# Patient Record
(demographics unavailable — no encounter records)

---

## 2025-01-30 NOTE — REVIEW OF SYSTEMS
[SOB] : shortness of breath [Chest Discomfort] : chest discomfort [Negative] : Heme/Lymph [Dyspnea on exertion] : not dyspnea during exertion [Lower Ext Edema] : no extremity edema [Leg Claudication] : no intermittent leg claudication [Palpitations] : no palpitations [Orthopnea] : no orthopnea [Syncope] : no syncope

## 2025-01-30 NOTE — DISCUSSION/SUMMARY
[Patient] : the patient [Risks] : risks [Benefits] : benefits [Alternatives] : alternatives [With Me] : with me [___ Month(s)] : in [unfilled] month(s) [FreeTextEntry1] : This is a  60 year old male with history of hypothyroidism, dyslipidemia found to have Severe RCA stenosis s/p PCI  1)   orthopnea:  chest pain ing supine. lefft arm pain:     Multivitamin.  ct current antgina meds. if [id number] symptoms  then stress test.  or increase dose of anti anginal.  we will also do home sleep study for sleepapnea.  2) Coronary artery disease s/p PCI: PCI in the CABG.  recurrent angina with anxiety.  xanax PRn    yoga. medidation. xanax.    asa  plavix. , lipitor  80 mg.     On repatha    imudur  .   ct  ranexa to 1000 BID.  imdur 15 mg (since headcehs)   ct toprol 100 mg.      2) vasospastic angina:  on CCbs and Imdur and ranexa.   cardiac rehab.     norvasc 10  mg . LDL goal < 30  taking xanax for anxiety   on beta blocker; she does feel that she gets  mores when her heart rate increasing. when she is walking . so we will keep the beta blocker for now. we discussed about the dilemma regaring vasospasms and contraindications of beta blocker but her symptoms are not just one diagnosis as she also has ischemic heart disease. we deferred to titrate off the beta blocker .   3) dyslipidemia :  LDL 8  improved from 98   with high Tgs. vascepa.  repatha and statins  awesome cholesterol   cramps in the legs.  normal BENJAMÍN.    4) left leg claduciation.   normal BENJAMÍN.  [EKG obtained to assist in diagnosis and management of assessed problem(s)] : EKG obtained to assist in diagnosis and management of assessed problem(s)

## 2025-01-30 NOTE — CARDIOLOGY SUMMARY
[___] : [unfilled] [LVEF ___%] : LVEF [unfilled]% [Normal] : normal LA size [None] : no mitral regurgitation [de-identified] : 1 30 205: Sinus Rhythm  -Left atrial enlargement. -Negative precordial T-waves.  BORDERLINE 8 27 2024: Sinus Rhythm  -Negative precordial T-waves -Probably normal -     6 25 2024: Sinus Rhythm  -Negative precordial T-waves -   4 25 2024: Sinus Rhythm  -Left atrial enlargement. -Negative T-waves -   ABNORMAL  3 1 2023   Sinus  Rhythm  -Left atrial enlargement.   -  Negative precordial T-waves       1 24 2023   snus. lAE.  negative ST in precordial leads.   [de-identified] : july 2024:   Normal LVEF>  normal RV  mild MR.  mild TR  [de-identified] : feb 2023:  patent stent in LIMa to lad.  prox to mid LAD: no renal artery stneosis  [de-identified] : may 2023:  Normal BENJAMÍN and mild plawue. no stenosis  [de-identified] : aug 2024:  LDL : 8.  Total: 74.  Tgs: 118.  HDL: 45.   apr 2024:  LDL: 98;   HDL : 41.  Tgs: 120.  Total: 161  lipi dprofile:  LDL : 91.  Toal: 186l  Tgs: 282.  HDL 38

## 2025-01-30 NOTE — REASON FOR VISIT
[Follow-Up - From Hospitalization] : follow-up of a recent hospitalization for [FreeTextEntry1] : unstable angina, coronary artery disease , s/p PCI a [FreeTextEntry2] : unstable angina, coronary artery disease , s/p PCI

## 2025-01-30 NOTE — PHYSICAL EXAM
[General Appearance - Well Developed] : well developed [Normal Appearance] : normal appearance [Well Groomed] : well groomed [General Appearance - Well Nourished] : well nourished [No Deformities] : no deformities [General Appearance - In No Acute Distress] : no acute distress [Normal Conjunctiva] : the conjunctiva exhibited no abnormalities [Eyelids - No Xanthelasma] : the eyelids demonstrated no xanthelasmas [Normal Oral Mucosa] : normal oral mucosa [No Oral Pallor] : no oral pallor [No Oral Cyanosis] : no oral cyanosis [Normal Jugular Venous A Waves Present] : normal jugular venous A waves present [Normal Jugular Venous V Waves Present] : normal jugular venous V waves present [No Jugular Venous Aldana A Waves] : no jugular venous aldana A waves [Exaggerated Use Of Accessory Muscles For Inspiration] : no accessory muscle use [Respiration, Rhythm And Depth] : normal respiratory rhythm and effort [Auscultation Breath Sounds / Voice Sounds] : lungs were clear to auscultation bilaterally [Heart Rate And Rhythm] : heart rate and rhythm were normal [Heart Sounds] : normal S1 and S2 [Murmurs] : no murmurs present [Abdomen Soft] : soft [Abdomen Tenderness] : non-tender [Abdomen Mass (___ Cm)] : no abdominal mass palpated [Abnormal Walk] : normal gait [Gait - Sufficient For Exercise Testing] : the gait was sufficient for exercise testing [Nail Clubbing] : no clubbing of the fingernails [Cyanosis, Localized] : no localized cyanosis [Petechial Hemorrhages (___cm)] : no petechial hemorrhages [Skin Color & Pigmentation] : normal skin color and pigmentation [] : no rash [No Venous Stasis] : no venous stasis [Skin Lesions] : no skin lesions [No Skin Ulcers] : no skin ulcer [No Xanthoma] : no  xanthoma was observed [Oriented To Time, Place, And Person] : oriented to person, place, and time [Affect] : the affect was normal [Mood] : the mood was normal [No Anxiety] : not feeling anxious

## 2025-01-30 NOTE — HISTORY OF PRESENT ILLNESS
[FreeTextEntry1] : Coronary artery disease, s/p PCI.  HPI for today: ::   1 30 2025:    she had 2 ewpisode of diffetrnt type of spasm pain on the chest that went to her arms.  they were controlled otherwise . but recently it has been having more.  she cannot lay flat.  she has to sleep on the side.  and she cannto slay flat.  she feels heart is poinding and she gets the cehst pain and she has to sit up. she alarcon shave snoring.     ol dnopteL   8 27 2024:  she still has intermittent spasm.  she is taking beta blocker and also takigns  ranexa.    she feels when ever her heart rate is racing she still gets chest pain . ansd her pain with spasms.     old n ote:   6 25 2024: she is taking xanax intermittnet.  the chest pain . in frequently.  when she gets anxious.  when she walsk fast she gets this chest pain.   no .dizizness. no headacehs. no syncope. when barbara heart rates gets high, tshe gets ome angina  old note: still has axniety some days adn gts this vasospastic angina symptoms she is complaitn with meds.  no dizziness. no syncope  she alarcon shave left leg cramps.  worse when she walks    old note: she has been in pain since sept.  chest pressure.  intermtitent angina. she had recurrent angina symptoms and last cath done recently and was unremarkable . she has signfiicant vasospastic disease.  she got another cath unremarkable .  she has sisgniicnat vasospasm.  and we evaluate for renal artery stenosis.   she was started on amlidpine.  already on imdur.     old note:  she had a abnormal stress test .  and she had a cardiac cath done.  she had a PCi done.  but significant vasospasm. and small vessel disease. and spasm.  and she has been having back pain.  and she is avoiding nitro necause she is concerned about low BP.  She has not feelign good. she is also hacing headacehs she breathe and out ..  she is also having anxiety also this pain is worse at night over the weekend her pain was so severe    ol dnote: feels good. No chest pain. no headaches. no dizziness. no dizziness. NO headache.s no syncope.   old note: Does not exercise. physically active. no headache. no dizziness. no syncope. no chest pain. complaint with meds/   old note: Feels good. No dyspnea,. back to normal. back to work.   Old note: No new symptoms. Ambulates. No dizziness. no chest pain. COmpliant with meds. No bleeding complaints.   Discharge summary:  This is  a 52 year old woman with history of dyslipidemia, hypothyroidism, with mid-sternal chest pain and unstable angina s/p PCI with PAULA in RCA.

## 2025-05-01 NOTE — DISCUSSION/SUMMARY
ORTHO PROGRESS NOTE    2017    Admit Date: 2017  Admit Diagnosis: Lumbar spinal stenosis [M48.06]  Post Op day: 1 Day Post-Op      Subjective:     Minh Luu is a patient who is now 1 Day Post-Op  and has no complaints. Sitting on edge of bed eating. Objective:     PT/OT: progressing        Vital Signs:    Patient Vitals for the past 8 hrs:   BP Temp Pulse Resp SpO2   17 1139 106/46 98.4 °F (36.9 °C) 93 17 95 %   17 0856 - - - - 94 %   17 0738 113/60 98.1 °F (36.7 °C) 88 17 99 %     Temp (24hrs), Av.1 °F (36.7 °C), Min:97.5 °F (36.4 °C), Max:99.4 °F (37.4 °C)      LAB:    No results for input(s): HGB, WBC, PLT, HGBEXT, PLTEXT in the last 72 hours. I/O:   0701 -  1900  In: -   Out: 800 [Urine:500; Drains:300]   1901 -  0700  In: 3000 [I.V.:3000]  Out: 1485 [Urine:575; Drains:310]    Physical Exam:    Awake and in no acute distress. Mood and affect appropriate. Respirations unlabored and no evidence cyanosis. Calves nontender. Abdomen soft and nontender. Dressing clean/dry  No new neurologic deficit.     Assessment:      Patient Active Problem List   Diagnosis Code    GERD (gastroesophageal reflux disease) K21.9    Benign hypertrophy of prostate N40.0    Lumbar stenosis with neurogenic claudication M48.06    S/P prosthetic total arthroplasty of the hip Z96.649    Inguinal hernia, left K40.90    Arthritis M19.90    Chronic pain G89.29    Claustrophobia F40.240    Diseases of tricuspid valve I07.9    Inguinal hernia without mention of obstruction or gangrene, unilateral or unspecified, (not specified as recurrent) K40.90    Essential hypertension I10    Impotence of organic origin N52.9    Primary localized osteoarthrosis, lower leg M17.10    Lumbago M54.5    Intermittent palpitations R00.2    COPD (chronic obstructive pulmonary disease) (HCC) J44.9    Mitral regurgitation I34.0    Mixed hyperlipidemia E78.2    CAD (coronary artery disease) I25.10    S/P CABG (coronary artery bypass graft) Z95.1    History of tobacco abuse Z87.891    Alpha-1-antitrypsin deficiency ([facility] [address] E88.01    ED (erectile dysfunction) N52.9    Abnormal cardiovascular function study R94.30    Prostatic hypertrophy, benign N40.0    Chest discomfort R07.89    Snoring R06.83       1 Day Post-Op STATUS POST Procedure(s):  REVISION L3 L4 L5 S1 LAMINECTOMY AND FUSION TLIF AND INSTRUMENTATION      Plan:     Continue PT/OT/Rehab  Discontinue: castro  Consult: none  Anticipate discharge to: HOME tomorrow or Friday       Signed By: Margaret Guan NP [Patient] : the patient [Risks] : risks [Benefits] : benefits [Alternatives] : alternatives [With Me] : with me [___ Month(s)] : in [unfilled] month(s) [FreeTextEntry1] : This is a  60 year old male with history of hypothyroidism, dyslipidemia found to have Severe RCA stenosis s/p PCI  1)      orthopnea:  chest pain ing supine. lefft arm pain:     Multivitamin.  ct current antgina meds. if [id number] symptoms  then stress test.  or increase dose of anti anginal.  we will also do home sleep study for sleepapnea.  2) Coronary artery disease s/p PCI: PCI in the CABG.  recurrent angina with anxiety.  xanax PRn    yoga. medidation. xanax.    asa  plavix. , lipitor  80 mg.     On repatha    imudur  .   ct  ranexa to 1000 BID.  imdur 15 mg (since headcehs)   ct toprol 100 mg.      2) vasospastic angina:  on CCbs and Imdur and ranexa.   cardiac rehab.     norvasc 10  mg . LDL goal < 30  taking xanax for anxiety   on beta blocker;   s vasospasms and contraindications of beta blocker but her symptoms are not just one diagnosis as she also has ischemic heart disease. we deferred to titrate off the beta blocker .   will try [id number] 0.3 mg BID .   after fluoxetine.  if fluoxetine does not work, then wiltry colchcine.  also bLodo work CRp sent.  3) dyslipidemia :  LDL 8  improved from 98   with high Tgs. vascepa.  repatha and statins  awesome cholesterol   cramps in the legs.  normal BENJAMÍN.    4) left leg claduciation.   normal BENJAMÍN.  [EKG obtained to assist in diagnosis and management of assessed problem(s)] : EKG obtained to assist in diagnosis and management of assessed problem(s)

## 2025-05-01 NOTE — HISTORY OF PRESENT ILLNESS
[FreeTextEntry1] : Coronary artery disease, s/p PCI.  HPI for today: :: 5 1 2025:  . no chest pain .  when she gets sex.  she gets she gets tightness and spsasm.  and she alarcon snot feel well. orgasm causes her chest pain.   suddenly.  when she  gets excited .  and hurrying  adn she gets chest pain.    ol dnote:    1 30 2025:    she had 2 ewpisode of diffetrnt type of spasm pain on the chest that went to her arms.  they were controlled otherwise . but recently it has been having more.  she cannot lay flat.  she has to sleep on the side.  and she cannto slay flat.  she feels heart is poinding and she gets the cehst pain and she has to sit up. she alarcon shave snoring.     ol dnopteL   8 27 2024:  she still has intermittent spasm.  she is taking beta blocker and also takigns  ranexa.    she feels when ever her heart rate is racing she still gets chest pain . ansd her pain with spasms.     old n ote:   6 25 2024: she is taking xanax intermittnet.  the chest pain . in frequently.  when she gets anxious.  when she walsk fast she gets this chest pain.   no .dizizness. no headacehs. no syncope. when barbara heart rates gets high, tshe gets ome angina  old note: still has axniety some days adn gts this vasospastic angina symptoms she is complaitn with meds.  no dizziness. no syncope  she alarcon shave left leg cramps.  worse when she walks    old note: she has been in pain since sept.  chest pressure.  intermtitent angina. she had recurrent angina symptoms and last cath done recently and was unremarkable . she has signfiicant vasospastic disease.  she got another cath unremarkable .  she has sisgniicnat vasospasm.  and we evaluate for renal artery stenosis.   she was started on amlidpine.  already on imdur.     old note:  she had a abnormal stress test .  and she had a cardiac cath done.  she had a PCi done.  but significant vasospasm. and small vessel disease. and spasm.  and she has been having back pain.  and she is avoiding nitro necause she is concerned about low BP.  She has not feelign good. she is also hacing headacehs she breathe and out ..  she is also having anxiety also this pain is worse at night over the weekend her pain was so severe    ol dnote: feels good. No chest pain. no headaches. no dizziness. no dizziness. NO headache.s no syncope.   old note: Does not exercise. physically active. no headache. no dizziness. no syncope. no chest pain. complaint with meds/   old note: Feels good. No dyspnea,. back to normal. back to work.   Old note: No new symptoms. Ambulates. No dizziness. no chest pain. COmpliant with meds. No bleeding complaints.   Discharge summary:  This is  a 52 year old woman with history of dyslipidemia, hypothyroidism, with mid-sternal chest pain and unstable angina s/p PCI with PAULA in RCA.

## 2025-05-01 NOTE — CARDIOLOGY SUMMARY
[___] : [unfilled] [LVEF ___%] : LVEF [unfilled]% [Normal] : normal LA size [None] : no mitral regurgitation [de-identified] : 5 1 2025:  Sinus Rhythm  -Left atrial enlargement. -Negative precordial T-waves.  BORDERLINE 1 30 205: Sinus Rhythm  -Left atrial enlargement. -Negative precordial T-waves.  BORDERLINE 8 27 2024: Sinus Rhythm  -Negative precordial T-waves -Probably normal -     6 25 2024: Sinus Rhythm  -Negative precordial T-waves -   4 25 2024: Sinus Rhythm  -Left atrial enlargement. -Negative T-waves -   ABNORMAL  3 1 2023   Sinus  Rhythm  -Left atrial enlargement.   -  Negative precordial T-waves       1 24 2023   snus. lAE.  negative ST in precordial leads.   [de-identified] : july 2024:   Normal LVEF>  normal RV  mild MR.  mild TR  [de-identified] : feb 2023:  patent stent in LIMa to lad.  prox to mid LAD: no renal artery stneosis  [de-identified] : may 2023:  Normal BENJAMÍN and mild plawue. no stenosis  [de-identified] : aug 2024:  LDL : 8.  Total: 74.  Tgs: 118.  HDL: 45.   apr 2024:  LDL: 98;   HDL : 41.  Tgs: 120.  Total: 161  lipi dprofile:  LDL : 91.  Toal: 186l  Tgs: 282.  HDL 38